# Patient Record
Sex: FEMALE | Race: BLACK OR AFRICAN AMERICAN | NOT HISPANIC OR LATINO | Employment: FULL TIME | URBAN - METROPOLITAN AREA
[De-identification: names, ages, dates, MRNs, and addresses within clinical notes are randomized per-mention and may not be internally consistent; named-entity substitution may affect disease eponyms.]

---

## 2019-10-01 ENCOUNTER — TRANSCRIBE ORDERS (OUTPATIENT)
Dept: PHYSICAL THERAPY | Facility: CLINIC | Age: 44
End: 2019-10-01

## 2019-10-01 DIAGNOSIS — S46.912A STRAIN OF LEFT SHOULDER, INITIAL ENCOUNTER: Primary | ICD-10-CM

## 2019-10-03 ENCOUNTER — TREATMENT (OUTPATIENT)
Dept: PHYSICAL THERAPY | Facility: CLINIC | Age: 44
End: 2019-10-03

## 2019-10-03 DIAGNOSIS — S46.912D STRAIN OF LEFT SHOULDER, SUBSEQUENT ENCOUNTER: Primary | ICD-10-CM

## 2019-10-03 PROCEDURE — 97014 ELECTRIC STIMULATION THERAPY: CPT | Performed by: PHYSICAL THERAPIST

## 2019-10-03 PROCEDURE — 97161 PT EVAL LOW COMPLEX 20 MIN: CPT | Performed by: PHYSICAL THERAPIST

## 2019-10-03 PROCEDURE — 97110 THERAPEUTIC EXERCISES: CPT | Performed by: PHYSICAL THERAPIST

## 2019-10-03 NOTE — PROGRESS NOTES
Physical Therapy Initial Evaluation and Plan of Care        Subjective Evaluation    History of Present Illness  Date of onset: 2019  Mechanism of injury: Patient was lifting boxes at work.  Patient reports lifting them [place].      Patient Occupation: Warehouse   Precautions and Work Restrictions: normal job duties despite MD requesting restrictionsPain  Current pain ratin  At worst pain ratin  Location: left lateral shoulder  Quality: dull ache and throbbing  Relieving factors: rest  Aggravating factors: movement and lifting (laying on it)  Progression: worsening    Hand dominance: right             Objective       Palpation     Additional Palpation Details  TTP to the left LHB tendon, SS tendon, AC joint and posterior RC tendons.    Active Range of Motion   Left Shoulder   Flexion: 117 degrees with pain  Abduction: 125 degrees with pain  External rotation 0°: 19 degrees with pain  Internal rotation BTB: L5 with pain    Strength/Myotome Testing     Left Shoulder     Planes of Motion   Flexion: 4+   Abduction: 4+   External rotation at 0°: 4-   Internal rotation at 0°: 4-     Tests     Left Shoulder   Positive active compression ([place]) and empty can.   Negative Hawkin's.          Assessment & Plan     Assessment  Impairments: abnormal or restricted ROM, activity intolerance, impaired physical strength, lacks appropriate home exercise program and pain with function  Assessment details: Patient presents with c/o pain, TTP, limited AROM, decreased strength and positive special testing which is limiting her ability to perform full job duties and ADL'S.  Barriers to therapy: none  Prognosis: good  Prognosis details: STG's  1)  Independent with HEP  2)  Decrease pain by 50% or more  3)  AROM WNL for the left shoulder without pain  4)  No TTP to the left shoulder    LTG's  1)  Independent with HEP progression  2)  Decrease pain by 75% or more  3)  Increase strength for the left shoulder to 4+/5 or more  4)   Negative special testing  5)  Patient to lift waist to er up to 25 lbs  6)  Patient to return to ADL'S without limitations       Plan  Therapy options: will be seen for skilled physical therapy services  Planned modality interventions: TENS and cryotherapy  Planned therapy interventions: strengthening, stretching, therapeutic activities, home exercise program and manual therapy  Treatment plan discussed with: patient        Manual Therapy:    0     mins  51377;  Therapeutic Exercise:    14     mins  51909;     Neuromuscular Cyrus:    0    mins  78538;    Therapeutic Activity:     0     mins  13524;     Gait Trainin     mins  55604;     Ultrasound:     0     mins  72841;    Work Hardening           0      mins 04112  Iontophoresis               0   mins 80318    Timed Treatment:   14   mins   Total Treatment:     40   mins    PT SIGNATURE: Fuentes Burns, PT   DATE TREATMENT INITIATED: 10/3/2019    Initial Certification  Certification Period: 2020  I certify that the therapy services are furnished while this patient is under my care.  The services outlined above are required by this patient, and will be reviewed every 90 days.     PHYSICIAN: Cat Downey, APRN      DATE:     Please sign and return via fax to [phone].. Thank you, [facility].

## 2019-10-09 ENCOUNTER — TREATMENT (OUTPATIENT)
Dept: PHYSICAL THERAPY | Facility: CLINIC | Age: 44
End: 2019-10-09

## 2019-10-09 DIAGNOSIS — S46.912D STRAIN OF LEFT SHOULDER, SUBSEQUENT ENCOUNTER: Primary | ICD-10-CM

## 2019-10-09 PROCEDURE — 97110 THERAPEUTIC EXERCISES: CPT | Performed by: PHYSICAL THERAPIST

## 2019-10-09 PROCEDURE — 97014 ELECTRIC STIMULATION THERAPY: CPT | Performed by: PHYSICAL THERAPIST

## 2019-10-09 NOTE — PROGRESS NOTES
Physical Therapy Daily Progress Note             Subjective  Patient reports that the shoulder is hurting, rates the pain at 8-9/10.  Patient reports pain to the elbow and the shoulder blade.    Objective   See Exercise, Manual, and Modality Logs for complete treatment.       Assessment/Plan  Patient is in no acute distress despite a very high pain rating.  Patient repeatedly grabbed her left UT region during the routine.  Patient tolerated the progression of exercises moderately well, reported pain with some of the exercises, such as cane ER.                     Manual Therapy:    0     mins  88219;  Therapeutic Exercise:    25     mins  09494;   Neuromuscular Cyrus:    0    mins  84292;    Therapeutic Activity:     0     mins  42031;     Gait Trainin     mins  87262;     Ultrasound:     0     mins  54376;    Work Hardening           0      mins 36427  Iontophoresis               0   mins 96317    Timed Treatment:   25   mins   Total Treatment:     40   mins    Fuentes Burns, PT  Physical Therapist

## 2019-10-11 ENCOUNTER — TREATMENT (OUTPATIENT)
Dept: PHYSICAL THERAPY | Facility: CLINIC | Age: 44
End: 2019-10-11

## 2019-10-11 DIAGNOSIS — S46.912D STRAIN OF LEFT SHOULDER, SUBSEQUENT ENCOUNTER: Primary | ICD-10-CM

## 2019-10-11 PROCEDURE — 97014 ELECTRIC STIMULATION THERAPY: CPT | Performed by: PHYSICAL THERAPIST

## 2019-10-11 PROCEDURE — 97110 THERAPEUTIC EXERCISES: CPT | Performed by: PHYSICAL THERAPIST

## 2019-10-11 NOTE — PROGRESS NOTES
Re-Assessment / Re-Certification      Patient: Chandrika Velasquez   : 1975  Diagnosis/ICD-10 Code:  Strain of left shoulder, subsequent encounter [S46.912D]  Referring practitioner: ELIZABETH Fleming  Date of Initial Visit: 10/11/2019  Today's Date: 10/11/2019  Patient seen for 3 sessions      Subjective:   Chandrika Velasquez reports: pain in the shoulder rated at 5/10, reports that it feels the same.    Treatment has included: therapeutic exercise, electrical stimulation, moist heat and cryotherapy    Subjective   Objective       Active Range of Motion   Left Shoulder   Flexion: 134 degrees   Abduction: 136 degrees   External rotation 0°: 40 degrees   Internal rotation BTB: L1     Strength/Myotome Testing     Left Shoulder     Planes of Motion   Flexion: 4   Abduction: 4   External rotation at 0°: 4   Internal rotation at 0°: 4-     Tests     Left Shoulder   Positive empty can and Hawkin's.      Assessment/Plan  Patient has tolerated PT moderately well thus far.  AROM has improved for all planes of the shoulder.  Deficits persist with regard to pain, AROM, strength, special testing and function.      PT Signature: Fuentes Burns, PT        Manual Therapy:    0     mins  17497;  Therapeutic Exercise:    25     mins  26622;    Neuromuscular Cyrus:    0    mins  63023;    Therapeutic Activity:     0     mins  30993;     Gait Trainin     mins  88972;     Ultrasound:     0     mins  13822;    Work Hardening           0      mins 05376  Iontophoresis               0   mins 99889    Timed Treatment:   25   mins   Total Treatment:     40   mins

## 2019-10-22 ENCOUNTER — TRANSCRIBE ORDERS (OUTPATIENT)
Dept: ADMINISTRATIVE | Facility: HOSPITAL | Age: 44
End: 2019-10-22

## 2019-10-22 DIAGNOSIS — S46.912A LEFT SHOULDER STRAIN, INITIAL ENCOUNTER: Primary | ICD-10-CM

## 2019-12-10 ENCOUNTER — TRANSCRIBE ORDERS (OUTPATIENT)
Dept: PHYSICAL THERAPY | Facility: CLINIC | Age: 44
End: 2019-12-10

## 2019-12-10 DIAGNOSIS — M75.02 ADHESIVE CAPSULITIS OF LEFT SHOULDER: Primary | ICD-10-CM

## 2019-12-13 ENCOUNTER — TREATMENT (OUTPATIENT)
Dept: PHYSICAL THERAPY | Facility: CLINIC | Age: 44
End: 2019-12-13

## 2019-12-13 DIAGNOSIS — M75.02 ADHESIVE CAPSULITIS OF LEFT SHOULDER: ICD-10-CM

## 2019-12-13 DIAGNOSIS — G89.29 CHRONIC LEFT SHOULDER PAIN: Primary | ICD-10-CM

## 2019-12-13 DIAGNOSIS — M25.512 CHRONIC LEFT SHOULDER PAIN: Primary | ICD-10-CM

## 2019-12-13 PROCEDURE — 97110 THERAPEUTIC EXERCISES: CPT | Performed by: PHYSICAL THERAPIST

## 2019-12-13 PROCEDURE — 97164 PT RE-EVAL EST PLAN CARE: CPT | Performed by: PHYSICAL THERAPIST

## 2019-12-13 PROCEDURE — 97140 MANUAL THERAPY 1/> REGIONS: CPT | Performed by: PHYSICAL THERAPIST

## 2019-12-13 PROCEDURE — 97014 ELECTRIC STIMULATION THERAPY: CPT | Performed by: PHYSICAL THERAPIST

## 2019-12-13 NOTE — PROGRESS NOTES
Physical Therapy Initial Evaluation and Plan of Care        Subjective Evaluation    History of Present Illness  Date of onset: 2019  Mechanism of injury: Patient was lifting boxes at work.  Patient reports lifting them [place].  PT at 2 other clinics with some improvement.  MRI showed tendinitis but no tears      Patient Occupation: Warehouse   Precautions and Work Restrictions: no [place] with leftPain  Current pain ratin  At best pain ratin  At worst pain ratin  Location: left posterior shoulder  Quality: sharp (just pain)  Relieving factors: rest  Aggravating factors: movement, lifting, sleeping, outstretched reach and overhead activity (laying on it)  Progression: no change    Hand dominance: right    Diagnostic Tests  MRI studies: abnormal    Treatments  Previous treatment: physical therapy  Patient Goals  Patient goals for therapy: increased motion, decreased pain and return to work             Objective       Tenderness     Left Shoulder   Tenderness in the biceps tendon (proximal), infraspinatus tendon and scapular spine.     Cervical/Thoracic Screen   Cervical range of motion within normal limits  Cervical range of motion within normal limits with the following exceptions: FHP    Active Range of Motion   Left Shoulder   Flexion: 95 degrees   Abduction: 96 degrees with pain  External rotation BTH: Active external rotation behind the head: to side of head. with pain  Internal rotation BTB: Active internal rotation behind the back: to side of hip. with pain    Right Shoulder   Internal rotation BTB: L4     Passive Range of Motion   Left Shoulder   Flexion: 124 (empty end feel) degrees with pain  Abduction: 118 (empty end feel) degrees with pain  External rotation 45°: 43 (firm end feel) degrees with pain  Internal rotation 45°: 39 (hard end feel) degrees with pain    Strength/Myotome Testing     Left Shoulder     Planes of Motion   External rotation at 0°: 4 (pain)   Internal rotation at 0°: 4     Tests      Left Shoulder   Positive empty can and Hawkin's.     Additional Tests Details  Diffuse pain with Empty Can; less pain with IR vs ER with O'Briens         Assessment & Plan     Assessment  Impairments: abnormal or restricted ROM, activity intolerance, impaired physical strength and pain with function  Assessment details: 45 yo F presents 3+ mos after reported acute injury with persistent pain and increased tightness and limited mobility non-dominant shoulder.    Prognosis: fair  Functional Limitations: lifting, sleeping, reaching behind back, reaching overhead and unable to perform repetitive tasks  Goals  Plan Goals: STGs x 2 wk  1. ROM increasing with pain < 6/10  2. Strength increasing for improved ADLs  3. Review body mechanics with ADLs  4. demos shanika for and compliance with HEP    LTGs x 8 wks  1. ROM restored to WFL with pain < 3/10   2. Strength per MMT > 4/5  3. Negative impingement testing  4. Demonstrates tolerance for lift at all levels to allow RTW w/o restriction and return to normal home ADLs    Plan  Therapy options: will be seen for skilled physical therapy services  Planned modality interventions: cryotherapy, electrical stimulation/Cymro stimulation and ultrasound  Planned therapy interventions: manual therapy, joint mobilization, therapeutic activities, stretching, strengthening, home exercise program and body mechanics training  Frequency: 3x week  Duration in weeks: 8  Treatment plan discussed with: patient        Manual Therapy:    14     mins  26221;  Therapeutic Exercise:    18     mins  12684;     E-stim               15   mins 61118    Timed Treatment:   32   mins   Total Treatment:     65   mins    PT SIGNATURE: Gunjan Dixon PT   DATE TREATMENT INITIATED: 12/13/2019    Initial Certification  Certification Period: 3/12/2020  I certify that the therapy services are furnished while this patient is under my care.  The services outlined above are required by this patient, and will be  reviewed every 90 days.     PHYSICIAN: Isidoro Gonzalez MD      DATE:     Please sign and return via fax to [phone].. Thank you, [facility].

## 2019-12-16 ENCOUNTER — TREATMENT (OUTPATIENT)
Dept: PHYSICAL THERAPY | Facility: CLINIC | Age: 44
End: 2019-12-16

## 2019-12-16 DIAGNOSIS — M75.02 ADHESIVE CAPSULITIS OF LEFT SHOULDER: ICD-10-CM

## 2019-12-16 DIAGNOSIS — M25.512 CHRONIC LEFT SHOULDER PAIN: Primary | ICD-10-CM

## 2019-12-16 DIAGNOSIS — G89.29 CHRONIC LEFT SHOULDER PAIN: Primary | ICD-10-CM

## 2019-12-16 PROCEDURE — 97110 THERAPEUTIC EXERCISES: CPT | Performed by: PHYSICAL THERAPIST

## 2019-12-16 PROCEDURE — 97014 ELECTRIC STIMULATION THERAPY: CPT | Performed by: PHYSICAL THERAPIST

## 2019-12-16 PROCEDURE — 97140 MANUAL THERAPY 1/> REGIONS: CPT | Performed by: PHYSICAL THERAPIST

## 2019-12-16 NOTE — PROGRESS NOTES
Physical Therapy Daily Progress Note      Visit # 5      Subjective   Still problem sleeping and having some spasms in arm periodically    Objective   See Exercise, Manual, and Modality Logs for complete treatment.       Assessment/Plan    Continues with significant tightness left capsule with limited and painful ROM but some improvement noted during session. Able to progress ex but only lightly - still very limited tolernce    Progress ex as shanika                 Manual Therapy:    14     mins  66154;  Therapeutic Exercise:    20     mins  98870;     Estim   15 min 13932      Timed Treatment:   34   mins   Total Treatment:     49   mins    Gunjan Dixon PT  Physical Therapist  [place] License #[id number]

## 2019-12-18 ENCOUNTER — TREATMENT (OUTPATIENT)
Dept: PHYSICAL THERAPY | Facility: CLINIC | Age: 44
End: 2019-12-18

## 2019-12-18 DIAGNOSIS — M75.02 ADHESIVE CAPSULITIS OF LEFT SHOULDER: ICD-10-CM

## 2019-12-18 DIAGNOSIS — G89.29 CHRONIC LEFT SHOULDER PAIN: Primary | ICD-10-CM

## 2019-12-18 DIAGNOSIS — M25.512 CHRONIC LEFT SHOULDER PAIN: Primary | ICD-10-CM

## 2019-12-18 PROCEDURE — 97140 MANUAL THERAPY 1/> REGIONS: CPT | Performed by: PHYSICAL THERAPIST

## 2019-12-18 PROCEDURE — 97110 THERAPEUTIC EXERCISES: CPT | Performed by: PHYSICAL THERAPIST

## 2019-12-18 NOTE — PROGRESS NOTES
Physical Therapy Daily Progress Note      Visit # 6      Subjective   The pain won't go away    Objective       Passive Range of Motion   Left Shoulder   Flexion: 134 degrees with pain  Abduction: 135 (scap plane) degrees with pain  External rotation 45°: 43 degrees   Internal rotation 45°: 42 degrees      See Exercise, Manual, and Modality Logs for complete treatment.       Assessment/Plan    ROM improving but still notably painful.  Able to progress ex lightly.    Continue to progress as shanika with focus on mobility             Manual Therapy:    14     mins  27105;  Therapeutic Exercise:    24     mins  89230;           Timed Treatment:   38   mins   Total Treatment:     53   mins    Gunjan Dixon PT  Physical Therapist  [place] License #[id number]

## 2019-12-20 ENCOUNTER — TREATMENT (OUTPATIENT)
Dept: PHYSICAL THERAPY | Facility: CLINIC | Age: 44
End: 2019-12-20

## 2019-12-20 DIAGNOSIS — G89.29 CHRONIC LEFT SHOULDER PAIN: Primary | ICD-10-CM

## 2019-12-20 DIAGNOSIS — M25.512 CHRONIC LEFT SHOULDER PAIN: Primary | ICD-10-CM

## 2019-12-20 DIAGNOSIS — M75.02 ADHESIVE CAPSULITIS OF LEFT SHOULDER: ICD-10-CM

## 2019-12-20 PROCEDURE — 97110 THERAPEUTIC EXERCISES: CPT | Performed by: PHYSICAL THERAPIST

## 2019-12-20 PROCEDURE — 97140 MANUAL THERAPY 1/> REGIONS: CPT | Performed by: PHYSICAL THERAPIST

## 2019-12-20 NOTE — PROGRESS NOTES
Physical Therapy Daily Progress Note      Visit # 7      Subjective   Got a cold/heat gel pack for home and it seems to help.  I know im getting better because I can reach better    Objective   See Exercise, Manual, and Modality Logs for complete treatment.       Assessment/Plan    P/AAROM continue to improve but still with moderate pain.  Able to increase ex lightlty    Continue with focus on restroring functional mobility             Manual Therapy:    16     mins  31559;  Therapeutic Exercise:    26     mins  57597;         Timed Treatment:   42   mins   Total Treatment:     52   mins    Gunjan Dixon PT  Physical Therapist  [place] License #[id number]

## 2019-12-24 ENCOUNTER — TREATMENT (OUTPATIENT)
Dept: PHYSICAL THERAPY | Facility: CLINIC | Age: 44
End: 2019-12-24

## 2019-12-24 DIAGNOSIS — G89.29 CHRONIC LEFT SHOULDER PAIN: Primary | ICD-10-CM

## 2019-12-24 DIAGNOSIS — M25.512 CHRONIC LEFT SHOULDER PAIN: Primary | ICD-10-CM

## 2019-12-24 DIAGNOSIS — M75.02 ADHESIVE CAPSULITIS OF LEFT SHOULDER: ICD-10-CM

## 2019-12-24 PROCEDURE — 97140 MANUAL THERAPY 1/> REGIONS: CPT | Performed by: PHYSICAL THERAPIST

## 2019-12-24 PROCEDURE — 97110 THERAPEUTIC EXERCISES: CPT | Performed by: PHYSICAL THERAPIST

## 2019-12-24 NOTE — PROGRESS NOTES
Physical Therapy Daily Progress Note      Visit # 8      Subjective   Doing OK - I have more mobility    Objective   See Exercise, Manual, and Modality Logs for complete treatment.       Assessment/Plan    ROM gradually improving but still moderately painful.  Tolerated progression of several ex.    Continue with progression as shanika               Manual Therapy:    15     mins  63103;  Therapeutic Exercise:    27     mins  57524;         Timed Treatment:   42   mins   Total Treatment:     62   mins    Gunjan Dixon PT  Physical Therapist  [place] License #[id number]

## 2019-12-27 ENCOUNTER — TREATMENT (OUTPATIENT)
Dept: PHYSICAL THERAPY | Facility: CLINIC | Age: 44
End: 2019-12-27

## 2019-12-27 DIAGNOSIS — G89.29 CHRONIC LEFT SHOULDER PAIN: Primary | ICD-10-CM

## 2019-12-27 DIAGNOSIS — M75.02 ADHESIVE CAPSULITIS OF LEFT SHOULDER: ICD-10-CM

## 2019-12-27 DIAGNOSIS — M25.512 CHRONIC LEFT SHOULDER PAIN: Primary | ICD-10-CM

## 2019-12-27 DIAGNOSIS — S46.912D STRAIN OF LEFT SHOULDER, SUBSEQUENT ENCOUNTER: ICD-10-CM

## 2019-12-27 PROCEDURE — 97110 THERAPEUTIC EXERCISES: CPT | Performed by: PHYSICAL THERAPIST

## 2019-12-27 PROCEDURE — 97140 MANUAL THERAPY 1/> REGIONS: CPT | Performed by: PHYSICAL THERAPIST

## 2019-12-27 NOTE — PROGRESS NOTES
Physical Therapy Daily Progress Note      Visit # 9      Subjective   Still pain but I'm not babying it.  Other shoulder hurting too.    Objective   See Exercise, Manual, and Modality Logs for complete treatment.       Assessment/Plan    Overall improved mobility but still notably tight and guarded.  Attempted self post and ant stretches but still not tolerating.  Able to progress some other ex    Reassess             Manual Therapy:    16     mins  28446;  Therapeutic Exercise:    33     mins  09081;         Timed Treatment:   49   mins   Total Treatment:     59   mins    Gunjan Dixon PT  Physical Therapist  [place] License #[id number]

## 2019-12-30 ENCOUNTER — TREATMENT (OUTPATIENT)
Dept: PHYSICAL THERAPY | Facility: CLINIC | Age: 44
End: 2019-12-30

## 2019-12-30 DIAGNOSIS — G89.29 CHRONIC LEFT SHOULDER PAIN: Primary | ICD-10-CM

## 2019-12-30 DIAGNOSIS — M25.512 CHRONIC LEFT SHOULDER PAIN: Primary | ICD-10-CM

## 2019-12-30 DIAGNOSIS — M75.02 ADHESIVE CAPSULITIS OF LEFT SHOULDER: ICD-10-CM

## 2019-12-30 DIAGNOSIS — S46.912D STRAIN OF LEFT SHOULDER, SUBSEQUENT ENCOUNTER: ICD-10-CM

## 2019-12-30 PROCEDURE — 97110 THERAPEUTIC EXERCISES: CPT | Performed by: PHYSICAL THERAPIST

## 2019-12-30 PROCEDURE — 97140 MANUAL THERAPY 1/> REGIONS: CPT | Performed by: PHYSICAL THERAPIST

## 2019-12-30 NOTE — PROGRESS NOTES
Physical Therapy  Progress Note        12/30/2019  Isidoro Gonzalez MD    Re: Chandrika Velasquez  ________________________________________________________________    Ms. Chandrika Velasquez, has attended 10 PT sessions.  Treatment has consisted of: manual, there-ex, HEP, modalities     S: Ms. Chandrika Velasquez states: using my arm more but stop if it hurts.  Have more mobility but today is not a good day.  Having to sleep on left some due to right shoulder pain        Subjective     Objective       Palpation   Left   Hypertonic in the upper trapezius.     Tenderness     Left Shoulder   No tenderness in the biceps tendon (proximal), infraspinatus tendon and scapular spine.     Cervical/Thoracic Screen   Cervical range of motion within normal limits  Cervical range of motion within normal limits with the following exceptions: FHP    Active Range of Motion   Left Shoulder   Flexion: 100 degrees   Abduction: 108 degrees with pain  External rotation BTH: C2 with pain  Internal rotation BTB: Active internal rotation behind the back: to back pocket. with pain    Right Shoulder   Internal rotation BTB: L4     Passive Range of Motion   Left Shoulder   Flexion: 137 (empty end feel) degrees with pain  Abduction: 156 (scap plane) degrees with pain  External rotation 45°: 40 degrees with pain  External rotation 90°: 38 degrees with pain  Internal rotation 45°: 53 (hard end feel) degrees with pain    Strength/Myotome Testing     Left Shoulder     Planes of Motion   External rotation at 0°: 4+   Internal rotation at 0°: 4+     Right Shoulder     Planes of Motion   External rotation at 0°: 5   Internal rotation at 0°: 5     Tests     Left Shoulder   Positive Hawkin's.     Additional Tests Details  Diffuse but mild pain with Empty Can; same pain with IR vs ER with O'Briens; tight post and inf capsule     See Exercise, Manual, and Modality Logs for complete treatment.       Assessment/Plan    A/PROM overll improved since eval but still quite  limited with pain and some substitution.  Tight capsule.  Limited tolerance for even gentle exercises.  More limited today due to sleeping on left shoulder some since also having right shoulder pain.    To MD             Manual Therapy:    14     mins  10993;  Therapeutic Exercise:    29     mins  90666;       Timed Treatment:   43   mins   Total Treatment:     43   mins    Gunjan Dixon, PATRICIA  Physical Therapist

## 2020-01-07 ENCOUNTER — TREATMENT (OUTPATIENT)
Dept: PHYSICAL THERAPY | Facility: CLINIC | Age: 45
End: 2020-01-07

## 2020-01-07 DIAGNOSIS — G89.29 CHRONIC LEFT SHOULDER PAIN: Primary | ICD-10-CM

## 2020-01-07 DIAGNOSIS — M25.512 CHRONIC LEFT SHOULDER PAIN: Primary | ICD-10-CM

## 2020-01-07 DIAGNOSIS — M75.02 ADHESIVE CAPSULITIS OF LEFT SHOULDER: ICD-10-CM

## 2020-01-07 PROCEDURE — 97140 MANUAL THERAPY 1/> REGIONS: CPT | Performed by: PHYSICAL THERAPIST

## 2020-01-07 PROCEDURE — 97110 THERAPEUTIC EXERCISES: CPT | Performed by: PHYSICAL THERAPIST

## 2020-01-07 NOTE — PROGRESS NOTES
Physical Therapy Daily Progress Note      Visit # 11      Subjective   MD said can't do anything for the pain.  Still hurts but I'm working through it.    Objective   See Exercise, Manual, and Modality Logs for complete treatment.       Assessment/Plan    Continues with moderately limited and painful ROM but gradually improving.  Able to progress stretches that previously did not tolerate    Progress manual and ex as shanika               Manual Therapy:    16     mins  38245;  Therapeutic Exercise:    34     mins  37196;         Timed Treatment:   50   mins   Total Treatment:     60   mins    Gunjan Dixon PT  Physical Therapist  [place] License #[id number]

## 2020-01-09 ENCOUNTER — TREATMENT (OUTPATIENT)
Dept: PHYSICAL THERAPY | Facility: CLINIC | Age: 45
End: 2020-01-09

## 2020-01-09 DIAGNOSIS — M75.02 ADHESIVE CAPSULITIS OF LEFT SHOULDER: ICD-10-CM

## 2020-01-09 DIAGNOSIS — M25.512 CHRONIC LEFT SHOULDER PAIN: Primary | ICD-10-CM

## 2020-01-09 DIAGNOSIS — G89.29 CHRONIC LEFT SHOULDER PAIN: Primary | ICD-10-CM

## 2020-01-09 PROCEDURE — 97110 THERAPEUTIC EXERCISES: CPT | Performed by: PHYSICAL THERAPIST

## 2020-01-09 PROCEDURE — 97140 MANUAL THERAPY 1/> REGIONS: CPT | Performed by: PHYSICAL THERAPIST

## 2020-01-09 NOTE — PROGRESS NOTES
Physical Therapy Daily Progress Note      Visit # 12      Subjective   OK - I'm ready to work it out today    Objective   See Exercise, Manual, and Modality Logs for complete treatment.       Assessment/Plan    Slow, gradual improvement with increasing mobility but still with moderate pain and restrictions.  Tolerating increasing ex.    Continue with focus on increasing mobility with dec pain             Manual Therapy:    16     mins  56036;  Therapeutic Exercise:    38     mins  50566;         Timed Treatment:   54   mins   Total Treatment:     59   mins    Gunjan Dixon PT  Physical Therapist  [place] License #[id number]

## 2020-01-10 ENCOUNTER — TREATMENT (OUTPATIENT)
Dept: PHYSICAL THERAPY | Facility: CLINIC | Age: 45
End: 2020-01-10

## 2020-01-10 DIAGNOSIS — M75.02 ADHESIVE CAPSULITIS OF LEFT SHOULDER: ICD-10-CM

## 2020-01-10 DIAGNOSIS — M25.512 CHRONIC LEFT SHOULDER PAIN: Primary | ICD-10-CM

## 2020-01-10 DIAGNOSIS — G89.29 CHRONIC LEFT SHOULDER PAIN: Primary | ICD-10-CM

## 2020-01-10 PROCEDURE — 97140 MANUAL THERAPY 1/> REGIONS: CPT | Performed by: PHYSICAL THERAPIST

## 2020-01-10 PROCEDURE — 97110 THERAPEUTIC EXERCISES: CPT | Performed by: PHYSICAL THERAPIST

## 2020-01-10 NOTE — PROGRESS NOTES
Physical Therapy Daily Progress Note      Visit # 13      Subjective   Better - not as painful    Objective   See Exercise, Manual, and Modality Logs for complete treatment.       Assessment/Plan    Continues with gradual improvement with increasing PROM, especially scapular,  but still with notable pain.      Progress ex as pain and mobility improve             Manual Therapy:    15     mins  46352;  Therapeutic Exercise:    38     mins  13317;         Timed Treatment:   53   mins   Total Treatment:     63   mins    Gunjan Dixon PT  Physical Therapist  [place] License #[id number]

## 2020-01-13 ENCOUNTER — TREATMENT (OUTPATIENT)
Dept: PHYSICAL THERAPY | Facility: CLINIC | Age: 45
End: 2020-01-13

## 2020-01-13 DIAGNOSIS — M75.02 ADHESIVE CAPSULITIS OF LEFT SHOULDER: ICD-10-CM

## 2020-01-13 DIAGNOSIS — G89.29 CHRONIC LEFT SHOULDER PAIN: Primary | ICD-10-CM

## 2020-01-13 DIAGNOSIS — M25.512 CHRONIC LEFT SHOULDER PAIN: Primary | ICD-10-CM

## 2020-01-13 PROCEDURE — 97110 THERAPEUTIC EXERCISES: CPT | Performed by: PHYSICAL THERAPIST

## 2020-01-13 PROCEDURE — 97140 MANUAL THERAPY 1/> REGIONS: CPT | Performed by: PHYSICAL THERAPIST

## 2020-01-13 NOTE — PROGRESS NOTES
Physical Therapy Daily Progress Note      Visit # 14      Subjective   Better this weekend painwise.  Got a hot pack at home and it helps.    Objective   See Exercise, Manual, and Modality Logs for complete treatment.       Assessment/Plan    Slow, gradual improvement.  Still notably tight and painful but less than previous.  She is very motivated to improve.    Progress ex as mobility improves             Manual Therapy:    14     mins  45250;  Therapeutic Exercise:    39     mins  76121;         Timed Treatment:   53   mins   Total Treatment:     53   mins    Gunjna Dixon PT  Physical Therapist  [place] License #[id number]

## 2020-01-15 ENCOUNTER — TREATMENT (OUTPATIENT)
Dept: PHYSICAL THERAPY | Facility: CLINIC | Age: 45
End: 2020-01-15

## 2020-01-15 DIAGNOSIS — M75.02 ADHESIVE CAPSULITIS OF LEFT SHOULDER: ICD-10-CM

## 2020-01-15 DIAGNOSIS — M25.512 CHRONIC LEFT SHOULDER PAIN: Primary | ICD-10-CM

## 2020-01-15 DIAGNOSIS — G89.29 CHRONIC LEFT SHOULDER PAIN: Primary | ICD-10-CM

## 2020-01-15 PROCEDURE — 97110 THERAPEUTIC EXERCISES: CPT | Performed by: PHYSICAL THERAPIST

## 2020-01-15 PROCEDURE — 97140 MANUAL THERAPY 1/> REGIONS: CPT | Performed by: PHYSICAL THERAPIST

## 2020-01-15 NOTE — PROGRESS NOTES
Physical Therapy Daily Progress Note      Visit # 15      Subjective   No new problems    Objective   See Exercise, Manual, and Modality Logs for complete treatment.       Assessment/Plan    Slow progress with mobility and ex and still notably limited with pain    Continue with focus on improving mobility with dec pain             Manual Therapy:    14     mins  11926;  Therapeutic Exercise:    40     mins  31605;         Timed Treatment:  54    mins   Total Treatment:     54   mins    Gunjan Dixon PT  Physical Therapist  [place] License #[id number]

## 2020-01-17 ENCOUNTER — TREATMENT (OUTPATIENT)
Dept: PHYSICAL THERAPY | Facility: CLINIC | Age: 45
End: 2020-01-17

## 2020-01-17 DIAGNOSIS — M75.02 ADHESIVE CAPSULITIS OF LEFT SHOULDER: ICD-10-CM

## 2020-01-17 DIAGNOSIS — M25.512 CHRONIC LEFT SHOULDER PAIN: Primary | ICD-10-CM

## 2020-01-17 DIAGNOSIS — G89.29 CHRONIC LEFT SHOULDER PAIN: Primary | ICD-10-CM

## 2020-01-17 PROCEDURE — 97110 THERAPEUTIC EXERCISES: CPT | Performed by: PHYSICAL THERAPIST

## 2020-01-17 PROCEDURE — 97140 MANUAL THERAPY 1/> REGIONS: CPT | Performed by: PHYSICAL THERAPIST

## 2020-01-17 NOTE — PROGRESS NOTES
Physical Therapy Daily Progress Note      Visit # 16      Subjective   About the same    Objective   See Exercise, Manual, and Modality Logs for complete treatment.       Assessment/Plan    Slow, gradual improvement with increasing mobility but still with notable pain.  Tolerated light progression of ex    Continue to progress manual and ex as shanika               Manual Therapy:    16     mins  29590;  Therapeutic Exercise:    41     mins  74063;         Timed Treatment:   57   mins   Total Treatment:     57   mins    Gunjan Dixon PT  Physical Therapist  [place] License #[id number]

## 2020-01-22 ENCOUNTER — TREATMENT (OUTPATIENT)
Dept: PHYSICAL THERAPY | Facility: CLINIC | Age: 45
End: 2020-01-22

## 2020-01-22 DIAGNOSIS — G89.29 CHRONIC LEFT SHOULDER PAIN: Primary | ICD-10-CM

## 2020-01-22 DIAGNOSIS — M25.512 CHRONIC LEFT SHOULDER PAIN: Primary | ICD-10-CM

## 2020-01-22 DIAGNOSIS — M75.02 ADHESIVE CAPSULITIS OF LEFT SHOULDER: ICD-10-CM

## 2020-01-22 PROCEDURE — 97140 MANUAL THERAPY 1/> REGIONS: CPT | Performed by: PHYSICAL THERAPIST

## 2020-01-22 PROCEDURE — 97110 THERAPEUTIC EXERCISES: CPT | Performed by: PHYSICAL THERAPIST

## 2020-01-22 NOTE — PROGRESS NOTES
Physical Therapy Daily Progress Note      Visit # 17      Subjective   No new - still hurts a lot    Objective       Passive Range of Motion   Left Shoulder   Flexion: 147 degrees with pain  Abduction: 160 (scap pl) degrees with pain  External rotation 45°: 50 degrees with pain  External rotation 90°: 53 degrees with pain  Internal rotation 0°: 50 degrees with pain     See Exercise, Manual, and Modality Logs for complete treatment.       Assessment/Plan    ROM gradually increasing but still notably painful.  Very tight capsule post and inf.    Continue to progress as shanika             Manual Therapy:    18     mins  26483;  Therapeutic Exercise:    41     mins  11078;         Timed Treatment:   59   mins   Total Treatment:     59   mins    Gunjan Dixon PT  Physical Therapist  [place] License #[id number]

## 2020-01-24 ENCOUNTER — TREATMENT (OUTPATIENT)
Dept: PHYSICAL THERAPY | Facility: CLINIC | Age: 45
End: 2020-01-24

## 2020-01-24 DIAGNOSIS — M75.02 ADHESIVE CAPSULITIS OF LEFT SHOULDER: ICD-10-CM

## 2020-01-24 DIAGNOSIS — M25.512 CHRONIC LEFT SHOULDER PAIN: Primary | ICD-10-CM

## 2020-01-24 DIAGNOSIS — G89.29 CHRONIC LEFT SHOULDER PAIN: Primary | ICD-10-CM

## 2020-01-24 PROCEDURE — 97110 THERAPEUTIC EXERCISES: CPT | Performed by: PHYSICAL THERAPIST

## 2020-01-24 PROCEDURE — 97140 MANUAL THERAPY 1/> REGIONS: CPT | Performed by: PHYSICAL THERAPIST

## 2020-01-24 NOTE — PROGRESS NOTES
Physical Therapy Daily Progress Note      Visit # 18      Subjective   Nothing new. Pain and stiffness R shoulder from sleeping on it.      Objective   See Exercise, Manual, and Modality Logs for complete treatment.       Assessment/Plan    Tolerated added resistance with a couple of ex.  Reported good stretch with pulleys so gave for home.  Still with pain and guarding limited mobility.    Continue to progress as shanika             Manual Therapy:    13     mins  30526;  Therapeutic Exercise:    48     mins  56508;         Timed Treatment:   61   mins   Total Treatment:     61   mins    Gunjan Dixon PT  Physical Therapist  [place] License #[id number]

## 2020-01-27 ENCOUNTER — TREATMENT (OUTPATIENT)
Dept: PHYSICAL THERAPY | Facility: CLINIC | Age: 45
End: 2020-01-27

## 2020-01-27 DIAGNOSIS — G89.29 CHRONIC LEFT SHOULDER PAIN: Primary | ICD-10-CM

## 2020-01-27 DIAGNOSIS — M75.02 ADHESIVE CAPSULITIS OF LEFT SHOULDER: ICD-10-CM

## 2020-01-27 DIAGNOSIS — S46.912D STRAIN OF LEFT SHOULDER, SUBSEQUENT ENCOUNTER: ICD-10-CM

## 2020-01-27 DIAGNOSIS — M25.512 CHRONIC LEFT SHOULDER PAIN: Primary | ICD-10-CM

## 2020-01-27 PROCEDURE — 97140 MANUAL THERAPY 1/> REGIONS: CPT | Performed by: PHYSICAL THERAPIST

## 2020-01-27 PROCEDURE — 97110 THERAPEUTIC EXERCISES: CPT | Performed by: PHYSICAL THERAPIST

## 2020-01-27 NOTE — PROGRESS NOTES
Physical Therapy Daily Progress Note      Visit # 19      Subjective Evaluation    History of Present Illness    Subjective comment: Patient reports that her shoulder is gettting better and able to use arm more and more.        Objective   See Exercise, Manual, and Modality Logs for complete treatment.       Assessment & Plan     Assessment  Assessment details: Pt tolerated treatment well.  She continues to have both posterior and inferior capsular tightness and significant subscapularis tightness.  Overall responded to manual therapy including contract/relax.      Plan  Plan details: Progress as tolerated.                      Manual Therapy:    12     mins  73368;  Therapeutic Exercise:    40     mins  27908;     Neuromuscular Cyrus:    0    mins  23707;    Therapeutic Activity:     0     mins  99995;     Gait Trainin     mins  35040;     Ultrasound:     0     mins  80701;    Work Hardening           0      mins 97886  Iontophoresis               0   mins 80169    Timed Treatment:   52   mins   Total Treatment:     52   mins    Sanjuana Enrique, PT  Physical Therapist

## 2020-01-29 ENCOUNTER — TREATMENT (OUTPATIENT)
Dept: PHYSICAL THERAPY | Facility: CLINIC | Age: 45
End: 2020-01-29

## 2020-01-29 DIAGNOSIS — M75.02 ADHESIVE CAPSULITIS OF LEFT SHOULDER: ICD-10-CM

## 2020-01-29 DIAGNOSIS — G89.29 CHRONIC LEFT SHOULDER PAIN: Primary | ICD-10-CM

## 2020-01-29 DIAGNOSIS — M25.512 CHRONIC LEFT SHOULDER PAIN: Primary | ICD-10-CM

## 2020-01-29 PROCEDURE — 97140 MANUAL THERAPY 1/> REGIONS: CPT | Performed by: PHYSICAL THERAPIST

## 2020-01-29 PROCEDURE — 97110 THERAPEUTIC EXERCISES: CPT | Performed by: PHYSICAL THERAPIST

## 2020-01-29 NOTE — PROGRESS NOTES
Physical Therapy Daily Progress Note      Visit # 20      Subjective   No new complaints - I know I'm getting better because I can do some things that I previously couldn't    Objective   See Exercise, Manual, and Modality Logs for complete treatment.       Assessment/Plan    Continues with moderate capsular and IS tightness but gradually improving with increasing A/PROM    Continue to progress as shanika             Manual Therapy:    14     mins  17559;  Therapeutic Exercise:    47     mins  47209;         Timed Treatment:   61   mins   Total Treatment:     61   mins    Gunjan Dixon PT  Physical Therapist  [place] License #[id number]

## 2020-01-30 ENCOUNTER — TREATMENT (OUTPATIENT)
Dept: PHYSICAL THERAPY | Facility: CLINIC | Age: 45
End: 2020-01-30

## 2020-01-30 DIAGNOSIS — M25.512 CHRONIC LEFT SHOULDER PAIN: Primary | ICD-10-CM

## 2020-01-30 DIAGNOSIS — M75.02 ADHESIVE CAPSULITIS OF LEFT SHOULDER: ICD-10-CM

## 2020-01-30 DIAGNOSIS — S46.912D STRAIN OF LEFT SHOULDER, SUBSEQUENT ENCOUNTER: ICD-10-CM

## 2020-01-30 DIAGNOSIS — G89.29 CHRONIC LEFT SHOULDER PAIN: Primary | ICD-10-CM

## 2020-01-30 PROCEDURE — 97110 THERAPEUTIC EXERCISES: CPT | Performed by: PHYSICAL THERAPIST

## 2020-01-30 PROCEDURE — 97140 MANUAL THERAPY 1/> REGIONS: CPT | Performed by: PHYSICAL THERAPIST

## 2020-01-30 NOTE — PROGRESS NOTES
"Physical Therapy Daily Progress Note      Visit # 21      Subjective Evaluation    History of Present Illness    Subjective comment: Patient reports that she continues to feel better \"I can do more than i could but still having problems lifting it overhead and out to the side\".       Objective   See Exercise, Manual, and Modality Logs for complete treatment.       Assessment & Plan     Assessment  Assessment details: Patient tolerated treatment well. She continues to have significant tightness through subscapularis and posterior capsule. Progress continues however slow.     Plan  Plan details: Progress as tolerated.                      Manual Therapy:    12     mins  54765;  Therapeutic Exercise:    40     mins  28793;     Neuromuscular Cyrus:    0    mins  14178;    Therapeutic Activity:     0     mins  20388;     Gait Trainin     mins  85158;     Ultrasound:     0     mins  35641;    Work Hardening           0      mins 72792  Iontophoresis               0   mins 37652    Timed Treatment:   52   mins   Total Treatment:     52   mins    Sanjuana Enrique, PT  Physical Therapist  "

## 2020-02-03 ENCOUNTER — TREATMENT (OUTPATIENT)
Dept: PHYSICAL THERAPY | Facility: CLINIC | Age: 45
End: 2020-02-03

## 2020-02-03 DIAGNOSIS — G89.29 CHRONIC LEFT SHOULDER PAIN: Primary | ICD-10-CM

## 2020-02-03 DIAGNOSIS — M25.512 CHRONIC LEFT SHOULDER PAIN: Primary | ICD-10-CM

## 2020-02-03 DIAGNOSIS — M75.02 ADHESIVE CAPSULITIS OF LEFT SHOULDER: ICD-10-CM

## 2020-02-03 PROCEDURE — 97140 MANUAL THERAPY 1/> REGIONS: CPT | Performed by: PHYSICAL THERAPIST

## 2020-02-03 PROCEDURE — 97110 THERAPEUTIC EXERCISES: CPT | Performed by: PHYSICAL THERAPIST

## 2020-02-03 NOTE — PROGRESS NOTES
Physical Therapy  Progress Note          2/3/2020  Isidoro Gonzalez MD    Re: Chandrika Velasquez  ________________________________________________________________    Ms. Chandrika Velasquez, has attended 19 PT sessions since your referral to our clinic.  Treatment has consisted of: manual, there-ex, HEP     S: Ms. Chandrika Velasquez states: now R shdr hurting about as much as left since having to sleep on it and use it more.  Can move left arm better but still not above shoulder.  Had a bad night sleeping last night.  Feel like I do a lot better on some days but then have days like this          Subjective     Objective       Palpation   Left   Hypertonic in the upper trapezius.   Tenderness of the upper trapezius.     Right   Hypertonic in the upper trapezius.     Tenderness     Left Shoulder   Tenderness in the biceps tendon (proximal), scapular spine, subscapularis tendon and supraspinatus tendon.     Cervical/Thoracic Screen   Cervical range of motion within normal limits  Cervical range of motion within normal limits with the following exceptions: FHP    Active Range of Motion   Left Shoulder   Flexion: 103 degrees with pain  Abduction: 101 degrees with pain  External rotation BTH: C3 with pain  Internal rotation BTB: Active internal rotation behind the back: to back pocket. with pain    Right Shoulder   Flexion: 129 degrees with pain  Abduction: 134 degrees with pain  External rotation BTH: T2   Internal rotation BTB: L4     Passive Range of Motion   Left Shoulder   Flexion: 144 (empty end feel) degrees with pain  Abduction: 150 (scap plane) degrees with pain  External rotation 45°: 52 degrees with pain  External rotation 90°: 46 degrees with pain  Internal rotation 45°: 51 (hard end feel) degrees with pain    Strength/Myotome Testing     Left Shoulder     Planes of Motion   External rotation at 0°: 4+   Left shoulder internal rotation strength at 0 degrees: 5-/5.     Right Shoulder     Planes of Motion   External rotation  at 0°: 4+   Internal rotation at 0°: 5     Tests     Left Shoulder   Positive Hawkin's.   Negative empty can.     Additional Tests Details   less pain with IR vs ER with O'Briens; tight post and inf capsule     See Exercise, Manual, and Modality Logs for complete treatment.       Assessment/Plan    Progress has fluctuated with good and bad days. Overall little change in A/PROM since last update but still improved since initial eval.  Still with mod-severe pain and capsular tightness.    To MD             Manual Therapy:    20     mins  72262;  Therapeutic Exercise:    45     mins  91491;         Timed Treatment:   65   mins   Total Treatment:     65   mins    Gunjan Dixon PT  Physical Therapist